# Patient Record
Sex: MALE | Race: WHITE | NOT HISPANIC OR LATINO | URBAN - METROPOLITAN AREA
[De-identification: names, ages, dates, MRNs, and addresses within clinical notes are randomized per-mention and may not be internally consistent; named-entity substitution may affect disease eponyms.]

---

## 2024-09-26 ENCOUNTER — APPOINTMENT (RX ONLY)
Dept: URBAN - METROPOLITAN AREA CLINIC 302 | Facility: CLINIC | Age: 44
Setting detail: DERMATOLOGY
End: 2024-09-26

## 2024-09-26 DIAGNOSIS — L72.0 EPIDERMAL CYST: ICD-10-CM

## 2024-09-26 PROCEDURE — 11900 INJECT SKIN LESIONS </W 7: CPT

## 2024-09-26 PROCEDURE — ? COUNSELING

## 2024-09-26 PROCEDURE — ? PRESCRIPTION

## 2024-09-26 PROCEDURE — ? INTRALESIONAL KENALOG

## 2024-09-26 PROCEDURE — ? PRESCRIPTION MEDICATION MANAGEMENT

## 2024-09-26 RX ORDER — DOXYCYCLINE HYCLATE 100 MG/1
CAPSULE, GELATIN COATED ORAL
Qty: 14 | Refills: 0 | Status: ERX | COMMUNITY
Start: 2024-09-26

## 2024-09-26 RX ADMIN — DOXYCYCLINE HYCLATE: 100 CAPSULE, GELATIN COATED ORAL at 00:00

## 2024-09-26 ASSESSMENT — LOCATION SIMPLE DESCRIPTION DERM: LOCATION SIMPLE: POSTERIOR NECK

## 2024-09-26 ASSESSMENT — LOCATION ZONE DERM: LOCATION ZONE: NECK

## 2024-09-26 ASSESSMENT — LOCATION DETAILED DESCRIPTION DERM: LOCATION DETAILED: LEFT POSTERIOR NECK

## 2024-09-26 NOTE — PROCEDURE: INTRALESIONAL KENALOG
Ndc# For Kenalog Only: 8030-8399-54
Which Kenalog Vial Was Used?: Kenalog 10 mg/ml (5 ml vial)
Bill For Wasted Drug (Kenalog)?: no
How Many Mls Were Removed From The 80 Mg/Ml (5ml) Vial When Preparing The Injectable Solution?: 0
Administered By (Optional): Mary Beth Monahan
Kenalog Preparation: Kenalog
Lot # For Kenalog (Optional): [id number]
Consent: The risks of atrophy were reviewed with the patient.
Detail Level: Simple
Validate Note Data When Using Inventory: Yes
Total Volume (Ccs): 0.3
Expiration Date For Kenalog (Optional): Mar 2026
Medical Necessity Clause: This procedure was medically necessary because the lesions that were treated were:
Concentration Of Kenalog Solution Injected (Mg/Ml): 5.0
Kenalog Type Of Vial: Multiple Dose

## 2024-09-26 NOTE — PROCEDURE: PRESCRIPTION MEDICATION MANAGEMENT
Plan: F/u in 1-2 weeks to evaluate response to treatment. Pt going on vacation to [facility] next week. If cyst is still present/painful when he gets back, recommend f/u visit and can consider excision for definitive removal if indicated
Initiate Treatment: Doxycycline 100mg BID x 1 week
Render In Strict Bullet Format?: No
Detail Level: Simple